# Patient Record
Sex: MALE | Race: WHITE
[De-identification: names, ages, dates, MRNs, and addresses within clinical notes are randomized per-mention and may not be internally consistent; named-entity substitution may affect disease eponyms.]

---

## 2021-03-08 ENCOUNTER — HOSPITAL ENCOUNTER (OUTPATIENT)
Dept: HOSPITAL 11 - JP.SDS | Age: 61
Discharge: HOME | End: 2021-03-08
Attending: SURGERY
Payer: COMMERCIAL

## 2021-03-08 DIAGNOSIS — Z88.2: ICD-10-CM

## 2021-03-08 DIAGNOSIS — D12.2: ICD-10-CM

## 2021-03-08 DIAGNOSIS — K57.30: ICD-10-CM

## 2021-03-08 DIAGNOSIS — Z12.11: Primary | ICD-10-CM

## 2021-03-08 PROCEDURE — 45380 COLONOSCOPY AND BIOPSY: CPT

## 2021-03-08 PROCEDURE — 88305 TISSUE EXAM BY PATHOLOGIST: CPT

## 2021-03-08 PROCEDURE — 45385 COLONOSCOPY W/LESION REMOVAL: CPT

## 2021-03-08 NOTE — OR
DATE OF PROCEDURE:  03/08/2021

 

SURGEON:  Bob Ellis MD

 

PROCEDURE:  Colonoscopy.

 

FINDINGS:

1. Ascending colon polyp, approximately 5 mm, completely removed using cold biopsy

    forceps.

2. Ascending colon polyp, approximately 8 mm, completely removed using hot snare wire

    device.

3. Diverticulosis, mild, limited to sigmoid colon.

 

COMPLICATIONS:  None.

 

ASSISTANT:  None.

 

ANESTHESIA:  MAC.

 

PREOPERATIVE DIAGNOSIS:  Screening colonoscopy.

 

POSTOPERATIVE DIAGNOSIS:  Screening colonoscopy.

 

RISKS:  Risks, benefits, alternatives, and limitations including, but not limited to

infection, bleeding, perforation, false positives, false negatives were explained to the

patient who wished to proceed.

 

PROCEDURE IN DETAIL:  The patient was placed in a left lateral decubitus position.  Digital

rectal exam was performed without abnormality.  Scope was introduced and advanced

atraumatically to the ileocecal valve.  A photo was taken of this.  Scope was brought back

to the ascending, transverse, descending colon, and retroflexed.

 

The aforementioned polyps were identified and completely removed.

 

No evidence of old or new blood.

 

Diverticulosis was described as mild, limited to sigmoid colon, mostly without evidence of

diverticulitis or bleeding.

 

No evidence of other abnormalities.  The prep was acceptable, approximately 90% of the

luminal surface could be seen.

 

No abnormalities on retroflexion.  Greater than 8 minutes was spent removing the scope.  The

patient tolerated the procedure well.

 

 

 

 

Bob Ellis MD

DD:  03/08/2021 08:37:19

DT:  03/08/2021 08:57:29

Job #:  [id number]